# Patient Record
(demographics unavailable — no encounter records)

---

## 2024-10-11 NOTE — DISCUSSION/SUMMARY
[de-identified] :  LLUVIA CRUZ is a 74 year old female who presents with right knee bone on bone arthritis. Nonoperative treatment options for knee arthritis were discussed including anti-inflammatories, physical therapy, intraarticular cortisone injections, and hyaluronic acid gel injections. She is not a candidate for total knee replacement due to her COPD.  A series of hyaluronic acid gel injections was ordered for the patient and are pending insurance approval. The office will follow up with the patient when they become available.
n/a

## 2024-10-11 NOTE — HISTORY OF PRESENT ILLNESS
[de-identified] : Ms. LLUVIA CRUZ is a 74 year old female presenting for evaluation of longstanding right knee pain now worsening.  The knee pain is localized medially and anteriorly.  She notes the pain is worse with weightbearing activity including walking long distance and rising from seated position.  She denies any falls or trauma.  She has a history of osteoarthritis has been treated conservatively thus far.  She had a cortisone shot "within a few weeks" without significant relief.  She has not had gel as far.  She has tried physical therapy and anti-inflammatory without relief. PMHx: COPD still currently smoking, "heart issues"

## 2024-10-11 NOTE — CONSULT LETTER
[Dear  ___] : Dear  [unfilled], [Consult Letter:] : I had the pleasure of evaluating your patient, [unfilled]. [Please see my note below.] : Please see my note below. [Consult Closing:] : Thank you very much for allowing me to participate in the care of this patient.  If you have any questions, please do not hesitate to contact me. [Sincerely,] : Sincerely, [FreeTextEntry2] : SONIA MEYER MD [FreeTextEntry3] : Rafi Coates MD Chief of Joint Replacement Primary & Revision Hip and Knee Replacement  [facility]

## 2024-10-11 NOTE — PHYSICAL EXAM
[de-identified] :  The patient appears well nourished and in no apparent distress. The patient is alert and oriented to person, place, and time. Affect and mood appear normal. The head is normocephalic and atraumatic. The eyes reveal normal sclera and extra ocular muscles are intact. The tongue is midline with no apparent lesions. Skin shows normal turgor with no evidence of eczema or psoriasis. No respiratory distress noted but is on nasal cannula oxygen. Sensation grossly intact. [de-identified] :  Exam of the right knee shows -20 to 100 degrees of flexion measured with a goniometer. Her pain limits her flexion. 5/5 motor strength bilaterally distally. Sensation intact distally. [de-identified] : X-ray: 4 views of the right knee demonstrate bone on bone arthritis

## 2024-10-14 NOTE — HISTORY OF PRESENT ILLNESS
[Current] : current [>= 20 pack years] : >= 20 pack years [Never] : never [TextBox_4] : 2/28/23 71y/o  female born in  [place]  ex smoker (  17- 70y/o   1 pack day  > 20 pack years  quit 2022)    COPD severe on 3l/min   - OOH  1/3/2023 72 year old female with h/o strtokes  walks with waker  COPD on 3L home O2, CAD s/p DARRELL 8/22, anxiety, depression, MACIEL, HLD, HTN, with admission for metabolic encephalopathy secondary to ZI presenting with confusion and b/l knee pain s/p mechanical fall last night/  UTI treated and  COPD exacerbation  then  rehab  - for follow up now - per daughter -  congestion  -  two cats at home - nebulizer machine     3/28/2023  71y/o female ex smoker  -   smokes in  house    COPD  f/u  f/u OOH  ZI/ encephalopathy pneumonia  Discharge Date	23-Mar-2023 Admission Date	15-Mar-2023 06:31 Reason for Admission	ZI, encephalopathy Hospital Course	 Patient is a 72 year old female with PMH HTN, Dyslipidemia, T2DM, COPD on home O2, CVA, CAD s/p PCI in 2022, MACIEL, Anxiety/Depression, Dementia, Anemia who presented with confusion and noted to have hyponatremia, hyperkalemia and have ZI at admission. She was admitted to the MICU with septic shock secondary to ESBL UTI and toxic metabolic encephalopathy. Hospital course significant for dysphagia and hypernatremia.  PT shock state resolved BP remains stable blood cultures are negative, IV abx completed today. Pt noted to have LLL PNA likely aspiration PNA will continue  oral Augmentin for 4 days after that to cover for aspiration PNA. Hypoxia improved to 2 liters, pt on home O2 at baseline. Patient seen and examined at the bedside today. Patient clinically stable at this time. No longer requires acute in hospital level of care. Can be continually followed/managed as an outpatient.   Med Reconciliation: Medication Reconciliation Status	Admission Reconciliation is Not Complete Discharge    per spouse   -  has nurse visiting  -now on 3l/min  oxygen - n o cough  -no n/v    - no  vomiting     eats ok  -takes her symbicort  at times nebulizer only   10/5/2023 73 year old female recurrent smoker now  10 cig/day   > 50 pack years   with h/o strtokes walks with waker COPD on 3L home O2, CAD s/p DARRELL 8/22 - now off blood thiners due to nasal bleeds , anxiety, depression, MACIEL, HLD, HTN,  - last admission  six months ago - flu shot   accepts today  - trelegy  100  covered -    azihthromy  mon wed fri  10/14/2024  daughter Kym today  75y/o  female  recurrent  smoker       1/2 pack day  h/o stroke  walks with walker COPD on 3L home O2, CAD s/p DARRELL 8/22 - now off blood thinners due to nasal bleeds , anxiety, depression, MACIEL, HLD, HTN,    COPD   - wants  new  neb - no hospitalizations in one year -  [TextBox_11] : 1 [TextBox_13] : 50

## 2024-10-14 NOTE — ASSESSMENT
[FreeTextEntry1] : 74y/o  female  1- recurrent smoker > 50 pack year  2-  severe COPD  GOLD 2 D    3l/min oxygen   quit smoking 2022     > 20 Pack year  / cat / second hand smoke 3- GERD/  hoarseness  ENT 2/23  no issues  nose bleed 4- vaccinations covid   pneumonia      Recommendations 1- screening  re-ordered not done    2      trelegy  100 qd 3-quit smoking encouraged  4- PPI resume 5-  needs new neb ordered -    encouraged to use her nebulizer TID  6-  azithromycin tiw     7-   wants inogen  -  will need arm raising exercise  to evalutae   ordered PFT/exercise    accepts flu shot today ordered   return in  2months

## 2024-10-14 NOTE — REASON FOR VISIT
[Follow-Up] : a follow-up visit [COPD] : COPD [[employer] ] : provided by [employer]   [Interpreters_IDNumber] : [id number] [Interpreters_FullName] : Cindy

## 2024-10-14 NOTE — REASON FOR VISIT
[Follow-Up] : a follow-up visit [COPD] : COPD [[employer] ] : provided by [employer]   [Interpreters_IDNumber] : [id number] [Interpreters_FullName] : Cindy

## 2024-10-14 NOTE — PHYSICAL EXAM
[IV] : Mallampati Class: IV [Normal Appearance] : normal appearance [Supple] : supple [No JVD] : no jvd [Normal Rate/Rhythm] : normal rate/rhythm [Normal S1, S2] : normal s1, s2 [No Murmurs] : no murmurs [No Resp Distress] : no resp distress [No Acc Muscle Use] : no acc muscle use [Clear to Auscultation Bilaterally] : clear to auscultation bilaterally [Kyphosis] : kyphosis [Benign] : benign [Not Tender] : not tender [No Masses] : no masses [Soft] : soft [No Hernias] : no hernias [No Clubbing] : no clubbing [No Rash] : no rash [Normal Affect] : normal affect [TextBox_2] : pleasant f no distress no cough  no sob  [TextBox_11] : crowded no lesion moist [TextBox_99] : wheelchair [TextBox_105] : mild edema [TextBox_132] : deconditioning

## 2024-10-14 NOTE — PROCEDURE
[FreeTextEntry1] : 10/5/2023  walk test  walker      rest   room air  92%     walk    88%  applied  3l/min   oxygen   > 90 %  PFT  5/2019    FVC  1.37 55%   FEV1  1.01   51 %  + small airway

## 2024-10-14 NOTE — REVIEW OF SYSTEMS
[SOB on Exertion] : sob on exertion [GERD] : gerd [Blood Transfusion] : blood transfusion [Focal Weakness] : focal weakness [Obesity] : obesity [Fever] : no fever [Recent Wt Gain (___ Lbs)] : ~T no recent weight gain [Chills] : no chills [Recent Wt Loss (___ Lbs)] : ~T no recent weight loss [Epistaxis] : no epistaxis [Sore Throat] : no sore throat [Nasal Congestion] : no nasal congestion [Sinus Problems] : no sinus problems [Cough] : no cough [Hemoptysis] : no hemoptysis [Sputum] : no sputum [Dyspnea] : no dyspnea [Wheezing] : no wheezing [Abdominal Pain] : no abdominal pain [Nausea] : no nausea [Vomiting] : no vomiting [Dysphagia] : no dysphagia [Bleeding] : no bleeding [Clotting Disorder/ Frequent bleeding] : no clotting disorder/ frequent bleeding [Diabetes] : no diabetes [Thyroid Problem] : no thyroid problem [TextBox_113] : transfusion in   hosptial

## 2024-10-25 NOTE — CARDIOLOGY SUMMARY
[de-identified] : 12/2021: No infarct/ischemia, LVEF 62% [de-identified] : 4/3/24:  1. Technically difficult image quality. 2. Left ventricular systolic function is hyperdynamic with an ejection fraction of 75 % by Aragon's method of disks with an ejection fraction visually estimated at >75 %. 3. Severe left ventricular hypertrophy. 4. There is evidence of hypertrophic cardiomyopathy, resting peak gradient = 39 mmHg which increase with valsalva peak gradient = 52 mmHg. 5. There is mild (grade 1) left ventricular diastolic dysfunction. 6. The left atrium is moderately dilated. 7. Normal right ventricular cavity size and normal systolic function. 8. There is mild calcification of the mitral valve annulus. 9. Ascending aorta diameter is dilated, measuring 3.80 cm (indexed 2.17 cm/m). 10. No pericardial effusion seen. 11. Compared to the transthoracic echocardiogram performed on 3/30/2023, LVOT gradient has improved from 97mmHg to 52mmHg with valsalva. [de-identified] : Amyloid SPECT negative 8/2022 [de-identified] : 8/2022: LM no disease, mLAD 80% s/p DARRELL, LCx mild disease, OM2 40%, OM3 40%, RCA mild disease

## 2024-10-25 NOTE — DISCUSSION/SUMMARY
[FreeTextEntry1] : 75 y/o F with PMH COPD, asthma, HTN, HLD, active smoker (1/2ppd, formerly 2ppd x >50 years), chronic respiratory failure on 2-3L home O2, HCM, CAD s/p NSTEMI and DARRELL to mLAD 8/2022 presents for follow up.  #HCM Most recent TTE 4/2024 showed PG 52 mmHg with valsalva (improved from >90mg) Continue metoprolol 50mg bid  CMRI was not approved by insurance  Genetic testing was done without identified cause for HCM  Was seen by Dr. Matt at [facility], patient is a candidate for Mavacamten. Encouraged patient to follow up with him, will need to arrange echo surveillance in our office as they have difficulty traveling to [facility]  1 week MCOT to assess for ventricular arrhythmias in the setting of HCM  #CAD s/p DARRELL to LAD Stable Continue ASA, statin   #HTN Controlled Off HCTZ-valsartan Continue metoprolol DASH diet discussed  #HLD Continue lipitor Patient due for repeat labs with PCP, will have lipid panel sent to us   #Tobacco Use Smoking cessation strongly advised I especially advised patient that she cannot smoke while on home O2  #Chronic respiratory failure/COPD Follow up with pulmonary  #Obesity BMI 30 Unable to exercise due to respiratory status Diet modifications discussed.

## 2024-10-25 NOTE — HISTORY OF PRESENT ILLNESS
[FreeTextEntry1] : 75 y/o F with PMH COPD, asthma, HTN, HLD, active smoker (1/2ppd, formerly 2ppd x >50 years), COPD, chronic respiratory failure on 4L home O2, HCM, CAD s/p NSTEMI and DARRELL to mLAD 8/2022, anemia with Schatzki ring and gastritis on EGD, h/o epistaxis with septal perforation.  Admitted to [facility] 11/2023 with lethargy and anemia secondary to recurrent epistaxis. Also had LLL consolidation on CT, suspected result of aspiration of blood. ASA was restarted inpatient. Metoprolol, HCTZ-valsartan, plavix, and atorvastatin were dc'ed.  Patient was last seen by me 1/18/24 with no changes in management  Was ordered cMRI for further evaluation of HCM, which was not approved by insurance  She had repeat TTE 4/2024 with PG 52mmHg with valsalva  She was previously referred to Jamin Matt for further management of HCM; was seen 12/15/23. Was advised to follow up to initiate mavacamten, daughter reported interest but has not followed up She underwent genetic testing with no genetic causes identified for HCM  She is currently at baseline state of health. Reports occasional palpitations. She denies CP, dizziness, lightheadedness, syncope or near syncope. Her breathing is at baseline, she is on supplemental O2 at time of exam.  Continues smoking

## 2024-11-01 NOTE — DISCUSSION/SUMMARY
[de-identified] :  LLUVIA CRUZ is a 74 year old female who presents with right knee bone on bone arthritis. Nonoperative treatment options for knee arthritis were discussed including anti-inflammatories, physical therapy, intraarticular cortisone injections, and hyaluronic acid gel injections. She is not a candidate for total knee replacement due to her COPD. She received the first of three gel injections to the right knee and tolerated well. Follow up in one week.

## 2024-11-01 NOTE — PROCEDURE
[de-identified] : Allergies: The patient denies allergies to medications and has no allergies to chicken,eggs, or feathers. Procedure: The patient has been identified by name and date of birth. Patient confirms that we are treating the right knee today. The knee was prepped in the usual sterile fashion. The areas were cleansed with chlorhexadine, then sprayed with ethyl chloride. The patient was then injected with the Euflexxa into the right knee in the anterolateral position. The patient tolerated the procedure well. The medication was delivered aseptically and atraumatically. Diagnosis: Osteoarthritis of the right knee Treatment: The patient was advised on the activities for today. I gave the patient instructions on postinjection ice and analgesia.

## 2024-11-01 NOTE — PHYSICAL EXAM
[de-identified] :  The patient appears well nourished and in no apparent distress. The patient is alert and oriented to person, place, and time. Affect and mood appear normal. The head is normocephalic and atraumatic. The eyes reveal normal sclera and extra ocular muscles are intact. The tongue is midline with no apparent lesions. Skin shows normal turgor with no evidence of eczema or psoriasis. No respiratory distress noted but is on nasal cannula oxygen. Sensation grossly intact. [de-identified] :  Exam of the right knee shows -20 to 100 degrees of flexion measured with a goniometer. Her pain limits her flexion. 5/5 motor strength bilaterally distally. Sensation intact distally. [de-identified] : Prior X-ray: 4 views of the right knee demonstrate bone on bone arthritis

## 2024-11-01 NOTE — PHYSICAL EXAM
[de-identified] :  The patient appears well nourished and in no apparent distress. The patient is alert and oriented to person, place, and time. Affect and mood appear normal. The head is normocephalic and atraumatic. The eyes reveal normal sclera and extra ocular muscles are intact. The tongue is midline with no apparent lesions. Skin shows normal turgor with no evidence of eczema or psoriasis. No respiratory distress noted but is on nasal cannula oxygen. Sensation grossly intact. [de-identified] :  Exam of the right knee shows -20 to 100 degrees of flexion measured with a goniometer. Her pain limits her flexion. 5/5 motor strength bilaterally distally. Sensation intact distally. [de-identified] : Prior X-ray: 4 views of the right knee demonstrate bone on bone arthritis

## 2024-11-01 NOTE — PROCEDURE
[de-identified] : Allergies: The patient denies allergies to medications and has no allergies to chicken,eggs, or feathers. Procedure: The patient has been identified by name and date of birth. Patient confirms that we are treating the right knee today. The knee was prepped in the usual sterile fashion. The areas were cleansed with chlorhexadine, then sprayed with ethyl chloride. The patient was then injected with the Euflexxa into the right knee in the anterolateral position. The patient tolerated the procedure well. The medication was delivered aseptically and atraumatically. Diagnosis: Osteoarthritis of the right knee Treatment: The patient was advised on the activities for today. I gave the patient instructions on postinjection ice and analgesia.

## 2024-11-01 NOTE — HISTORY OF PRESENT ILLNESS
[Current] : Current [TextBox_13] : Ms. CRUZ is a 74 year old female with a history of CAD and COPD.   She was called to review eligibility for Low-Dose CT lung cancer screening.  Reviewed and confirmed that the patient meets screening eligibility criteria:   74 years old   Smoking Status:  Current Smoker   Number of pack(s) per day: 1 Number of years smoked: 55 Number of pack years smokin   No symptoms of lung cancer, including new cough, change in cough, hemoptysis, and unintentional weight loss.   No personal history of lung cancer.  No lung cancer in a first degree relative.  No history of lung disease or occupational exposures. [PacksperYear] : 55 [de-identified] : Ms. LLUVIA CRUZ is a 74 year old female presenting for evaluation of longstanding right knee pain now worsening.  The knee pain is localized medially and anteriorly.  She notes the pain is worse with weightbearing activity including walking long distance and rising from seated position.  She denies any falls or trauma.  She has a history of osteoarthritis has been treated conservatively thus far.  She had a cortisone shot "within a few weeks" without significant relief.  She has not had gel as far.  She has tried physical therapy and anti-inflammatory without relief. She presents today for the first gel injection.  PMHx: COPD still currently smoking, "heart issues"

## 2024-11-01 NOTE — DISCUSSION/SUMMARY
[de-identified] :  LLUVIA CRUZ is a 74 year old female who presents with right knee bone on bone arthritis. Nonoperative treatment options for knee arthritis were discussed including anti-inflammatories, physical therapy, intraarticular cortisone injections, and hyaluronic acid gel injections. She is not a candidate for total knee replacement due to her COPD. She received the first of three gel injections to the right knee and tolerated well. Follow up in one week.

## 2024-11-01 NOTE — HISTORY OF PRESENT ILLNESS
[Current] : Current [TextBox_13] : Ms. CRUZ is a 74 year old female with a history of CAD and COPD.   She was called to review eligibility for Low-Dose CT lung cancer screening.  Reviewed and confirmed that the patient meets screening eligibility criteria:   74 years old   Smoking Status:  Current Smoker   Number of pack(s) per day: 1 Number of years smoked: 55 Number of pack years smokin   No symptoms of lung cancer, including new cough, change in cough, hemoptysis, and unintentional weight loss.   No personal history of lung cancer.  No lung cancer in a first degree relative.  No history of lung disease or occupational exposures. [PacksperYear] : 55 [de-identified] : Ms. LLUVIA CRUZ is a 74 year old female presenting for evaluation of longstanding right knee pain now worsening.  The knee pain is localized medially and anteriorly.  She notes the pain is worse with weightbearing activity including walking long distance and rising from seated position.  She denies any falls or trauma.  She has a history of osteoarthritis has been treated conservatively thus far.  She had a cortisone shot "within a few weeks" without significant relief.  She has not had gel as far.  She has tried physical therapy and anti-inflammatory without relief. She presents today for the first gel injection.  PMHx: COPD still currently smoking, "heart issues"

## 2024-11-06 NOTE — HISTORY OF PRESENT ILLNESS
[Current] : Current [TextBox_13] : Ms. CRUZ is a 74 year old female with a history of CAD and COPD.   Spoke to daughter, Kym to review eligibility for Low-Dose CT lung cancer screening.  Reviewed and confirmed that the patient meets screening eligibility criteria:   74 years old   Smoking Status:  Current Smoker   Number of pack(s) per day: 1 Number of years smoked: 50 Number of pack years smokin   No symptoms of lung cancer, including new cough, change in cough, hemoptysis, and unintentional weight loss.   No personal history of lung cancer.  No lung cancer in a first degree relative.  No history of lung disease or occupational exposures. [PacksperYear] : 50

## 2024-11-08 NOTE — HISTORY OF PRESENT ILLNESS
[de-identified] : The patient is here today for a Euflexxa injection for the right knee. The patient is having osteoarthritic symptoms.  Allergies: The patient denies allergies to medications and has no allergies to chicken,eggs, or feathers. Procedure: The patient has been identified by name and date of birth. Patient confirms that we are treating the right knee today. The knee was prepped in the usual sterile fashion. The areas were cleansed with chlorhexadine, then sprayed with ethyl chloride. The patient was then injected with the Euflexxa into the right knee in the anterolateral position. The patient tolerated the procedure well. The medication was delivered aseptically and atraumatically. Diagnosis: Osteoarthritis of the right knee Treatment: The patient was advised on the activities for today. I gave the patient instructions on postinjection ice and analgesia. Follow up in one week.

## 2024-11-08 NOTE — REASON FOR VISIT
[Follow-Up Visit] : a follow-up visit for [FreeTextEntry2] : right knee euiflexxa inj#2 lot# [id number] exp 2025/05/27.

## 2024-11-20 NOTE — REASON FOR VISIT
Facial [Follow-Up Visit] : a follow-up visit for [FreeTextEntry2] : right knee euiflexxa inj#3 lot# [id number] exp 2025/05/27.

## 2024-11-20 NOTE — HISTORY OF PRESENT ILLNESS
[de-identified] : Patient is here for the 3rd Euflexxa injection for the right knee.  Allergies: The patient denies allergies to medications and has no allergies to chicken,eggs, or feathers. Procedure: The patient has been identified by name and date of birth. Patient confirms that we are treating the right knee today. The knee was prepped in the usual sterile fashion. The knee joint space was identified using ultrasound. The area was cleansed with chlorhexadine, then sprayed with ethyl chloride. The patient was then injected with the Euflexxa into the right knee using ultrasound guidance  and the needle position in the superolateral joint space was confirmed. The patient tolerated the procedure well. The medication was delivered aseptically and atraumatically. Diagnosis: Osteoarthritis of the right knee.  Treatment: The patient was advised on the activities for today. I gave the patient instructions on postinjection ice and analgesia. The patient will follow up in 6 weeks.

## 2025-07-29 NOTE — REASON FOR VISIT
[Follow-Up] : a follow-up visit [Abnormal CXR/ Chest CT] : an abnormal CXR/ chest CT [COPD] : COPD [Emphysema] : emphysema

## 2025-07-30 NOTE — PHYSICAL EXAM
[IV] : Mallampati Class: IV [Normal Appearance] : normal appearance [Supple] : supple [No Neck Mass] : no neck mass [No JVD] : no jvd [Normal Rate/Rhythm] : normal rate/rhythm [Normal S1, S2] : normal s1, s2 [No Murmurs] : no murmurs [No Resp Distress] : no resp distress [No Acc Muscle Use] : no acc muscle use [Clear to Auscultation Bilaterally] : clear to auscultation bilaterally [Kyphosis] : kyphosis [Benign] : benign [Not Tender] : not tender [No Hernias] : no hernias [Normal Gait] : normal gait [Gait - Sufficient For Exercise Testing] : gait sufficient for exercise testing [No Clubbing] : no clubbing [No Edema] : no edema [No Rash] : no rash [No Motor Deficits] : no motor deficits [Normal Affect] : normal affect [TextBox_2] : pleasant f no distress no cough  [TextBox_11] : chronic eye changes  no lesion crowded airway  [TextBox_68] : bronchial cough noted

## 2025-07-30 NOTE — COUNSELING
[Cessation strategies including cessation program discussed] : Cessation strategies including cessation program discussed [Use of nicotine replacement therapies and other medications discussed] : Use of nicotine replacement therapies and other medications discussed [Yes] : Risk of tobacco use and health benefits of smoking cessation discussed: Yes [Smoking Cessation Program Referral] : Smoking Cessation Program Referral  [FreeTextEntry1] : 5

## 2025-07-30 NOTE — CARDIOLOGY SUMMARY
[de-identified] : SR, LVH with strain, lateral TWI [de-identified] : 12/2021: No infarct/ischemia, LVEF 62% [de-identified] : 4/3/24:  1. Technically difficult image quality. 2. Left ventricular systolic function is hyperdynamic with an ejection fraction of 75 % by Aragon's method of disks with an ejection fraction visually estimated at >75 %. 3. Severe left ventricular hypertrophy. 4. There is evidence of hypertrophic cardiomyopathy, resting peak gradient = 39 mmHg which increase with valsalva peak gradient = 52 mmHg. 5. There is mild (grade 1) left ventricular diastolic dysfunction. 6. The left atrium is moderately dilated. 7. Normal right ventricular cavity size and normal systolic function. 8. There is mild calcification of the mitral valve annulus. 9. Ascending aorta diameter is dilated, measuring 3.80 cm (indexed 2.17 cm/m). 10. No pericardial effusion seen. 11. Compared to the transthoracic echocardiogram performed on 3/30/2023, LVOT gradient has improved from 97mmHg to 52mmHg with valsalva. [de-identified] : Amyloid SPECT negative 8/2022 [de-identified] : 8/2022: LM no disease, mLAD 80% s/p DARRELL, LCx mild disease, OM2 40%, OM3 40%, RCA mild disease

## 2025-07-30 NOTE — REVIEW OF SYSTEMS
[SOB on Exertion] : sob on exertion [GERD] : gerd [Blood Transfusion] : blood transfusion [Focal Weakness] : focal weakness [Obesity] : obesity [Fever] : no fever [Recent Wt Gain (___ Lbs)] : ~T no recent weight gain [Chills] : no chills [Recent Wt Loss (___ Lbs)] : ~T no recent weight loss [Epistaxis] : no epistaxis [Sore Throat] : no sore throat [Nasal Congestion] : no nasal congestion [Sinus Problems] : no sinus problems [Cough] : no cough [Hemoptysis] : no hemoptysis [Sputum] : no sputum [Dyspnea] : no dyspnea [Wheezing] : no wheezing [Chest Discomfort] : no chest discomfort [Palpitations] : no palpitations [Abdominal Pain] : no abdominal pain [Nausea] : no nausea [Vomiting] : no vomiting [Dysphagia] : no dysphagia [Bleeding] : no bleeding [Chronic Pain] : no chronic pain [Rash] : no rash [Clotting Disorder/ Frequent bleeding] : no clotting disorder/ frequent bleeding [Diabetes] : no diabetes [Thyroid Problem] : no thyroid problem [TextBox_113] : transfusion in   hosptial

## 2025-07-30 NOTE — HISTORY OF PRESENT ILLNESS
[Current] : current [>= 20 pack years] : >= 20 pack years [Never] : never [TextBox_4] : 2/28/23 71y/o  female born in  [place]  ex smoker (  17- 72y/o   1 pack day  > 20 pack years  quit 2022)    COPD severe on 3l/min   - OOH  1/3/2023 72 year old female with h/o strtokes  walks with waker  COPD on 3L home O2, CAD s/p DARRELL 8/22, anxiety, depression, MACIEL, HLD, HTN, with admission for metabolic encephalopathy secondary to ZI presenting with confusion and b/l knee pain s/p mechanical fall last night/  UTI treated and  COPD exacerbation  then  rehab  - for follow up now - per daughter -  congestion  -  two cats at home - nebulizer machine     3/28/2023  71y/o female ex smoker  -   smokes in  house    COPD  f/u  f/u OOH  ZI/ encephalopathy pneumonia  Discharge Date	23-Mar-2023 Admission Date	15-Mar-2023 06:31 Reason for Admission	ZI, encephalopathy Hospital Course	 Patient is a 72 year old female with PMH HTN, Dyslipidemia, T2DM, COPD on home O2, CVA, CAD s/p PCI in 2022, MACIEL, Anxiety/Depression, Dementia, Anemia who presented with confusion and noted to have hyponatremia, hyperkalemia and have ZI at admission. She was admitted to the MICU with septic shock secondary to ESBL UTI and toxic metabolic encephalopathy. Hospital course significant for dysphagia and hypernatremia.  PT shock state resolved BP remains stable blood cultures are negative, IV abx completed today. Pt noted to have LLL PNA likely aspiration PNA will continue  oral Augmentin for 4 days after that to cover for aspiration PNA. Hypoxia improved to 2 liters, pt on home O2 at baseline. Patient seen and examined at the bedside today. Patient clinically stable at this time. No longer requires acute in hospital level of care. Can be continually followed/managed as an outpatient.   Med Reconciliation: Medication Reconciliation Status	Admission Reconciliation is Not Complete Discharge    per spouse   -  has nurse visiting  -now on 3l/min  oxygen - n o cough  -no n/v    - no  vomiting     eats ok  -takes her symbicort  at times nebulizer only   10/5/2023 73 year old female recurrent smoker now  10 cig/day   > 50 pack years   with h/o strtokes walks with waker COPD on 3L home O2, CAD s/p DARRELL 8/22 - now off blood thiners due to nasal bleeds , anxiety, depression, MACIEL, HLD, HTN,  - last admission  six months ago - flu shot   accepts today  - trelegy  100  covered -    azihthromy  mon wed fri  10/14/2024  daughter Kym today  75y/o  female  recurrent  smoker       1/2 pack day  h/o stroke  walks with walker COPD on 3L home O2, CAD s/p DARRELL 8/22 - now off blood thinners due to nasal bleeds , anxiety, depression, MACIEL, HLD, HTN,    COPD   - wants  new  neb - no hospitalizations in one year -   7/29/2025   daughter Kym  here today  76y/o   female recurrent  smoker   1/2  ppd not ready to quit   h/o  stroke,  CAD stent   COPD   on  3l/min     -  using nebulizer doing well  - no hemoptysis  no  chest pain  - azihtromycin  tiw at times forgets her medications -  [TextBox_11] : 1 [TextBox_13] : 50

## 2025-07-30 NOTE — PHYSICAL EXAM
[Well Developed] : well developed [Well Nourished] : well nourished [No Acute Distress] : no acute distress [Normal S1, S2] : normal S1, S2 [No Rub] : no rub [No Gallop] : no gallop [Clear Lung Fields] : clear lung fields [Soft] : abdomen soft [Non Tender] : non-tender [No Masses/organomegaly] : no masses/organomegaly [Normal Bowel Sounds] : normal bowel sounds [Normal Gait] : normal gait [No Edema] : no edema [Moves all extremities] : moves all extremities [Normal Speech] : normal speech [Alert and Oriented] : alert and oriented [de-identified] : +3/6 systolic murmur at LUSB [de-identified] : on supplemental O2

## 2025-07-30 NOTE — PHYSICAL EXAM
[Well Developed] : well developed [Well Nourished] : well nourished [No Acute Distress] : no acute distress [Normal S1, S2] : normal S1, S2 [No Rub] : no rub [No Gallop] : no gallop [Clear Lung Fields] : clear lung fields [Soft] : abdomen soft [Non Tender] : non-tender [No Masses/organomegaly] : no masses/organomegaly [Normal Bowel Sounds] : normal bowel sounds [Normal Gait] : normal gait [No Edema] : no edema [Moves all extremities] : moves all extremities [Normal Speech] : normal speech [Alert and Oriented] : alert and oriented [de-identified] : +3/6 systolic murmur at LUSB [de-identified] : on supplemental O2

## 2025-07-30 NOTE — CARDIOLOGY SUMMARY
[de-identified] : SR, LVH with strain, lateral TWI [de-identified] : 12/2021: No infarct/ischemia, LVEF 62% [de-identified] : 4/3/24:  1. Technically difficult image quality. 2. Left ventricular systolic function is hyperdynamic with an ejection fraction of 75 % by Aragon's method of disks with an ejection fraction visually estimated at >75 %. 3. Severe left ventricular hypertrophy. 4. There is evidence of hypertrophic cardiomyopathy, resting peak gradient = 39 mmHg which increase with valsalva peak gradient = 52 mmHg. 5. There is mild (grade 1) left ventricular diastolic dysfunction. 6. The left atrium is moderately dilated. 7. Normal right ventricular cavity size and normal systolic function. 8. There is mild calcification of the mitral valve annulus. 9. Ascending aorta diameter is dilated, measuring 3.80 cm (indexed 2.17 cm/m). 10. No pericardial effusion seen. 11. Compared to the transthoracic echocardiogram performed on 3/30/2023, LVOT gradient has improved from 97mmHg to 52mmHg with valsalva. [de-identified] : Amyloid SPECT negative 8/2022 [de-identified] : 8/2022: LM no disease, mLAD 80% s/p DARRELL, LCx mild disease, OM2 40%, OM3 40%, RCA mild disease

## 2025-07-30 NOTE — HISTORY OF PRESENT ILLNESS
[FreeTextEntry1] : 75F with PMH HCM (cMRI not approved by insurance, genetic testing without identified cause), COPD, asthma, HTN, HLD, active smoker (1/2ppd, formerly 2ppd x >50 years), chronic respiratory failure on 4L home O2, CAD s/p NSTEMI and DARRELL to mLAD 8/2022, anemia with Schatzki ring and gastritis on EGD, h/o epistaxis with septal perforation.   Patient was last seen by me 10/2024 She is a poor historian, history obtained from patient's daughter. Poor insight into health. Unsure of current medications. She is currently in her usual state of health. Patient has baseline dyspnea, on 4L home O2. She denies CP, palpitations, dizziness, lightheadedness, syncope or near syncope. Admits to some b/l LE edema to ankles after being on her feet all day.   Continues smoking  Has not followed up with Dr. Matt regarding HCM

## 2025-07-30 NOTE — DISCUSSION/SUMMARY
[FreeTextEntry1] : 75F with PMH HCM (cMRI not approved by insurance, genetic testing without identified cause), COPD, asthma, HTN, HLD, active smoker (1/2ppd, formerly 2ppd x >50 years), chronic respiratory failure on 4L home O2, CAD s/p NSTEMI and DARRELL to mLAD 8/2022, anemia with Schatzki ring and gastritis on EGD, h/o epistaxis with septal perforation.  #HCM Most recent TTE 4/2024 showed PG 52 mmHg with valsalva (improved from >90mg) Patient unsure of medications, was previously on metoprolol 50mg bid. Per patient's daughter, she was previously noncompliant with medication due to forgetfulness, but she now has a home aide and her daughter arranges her daily meds for her to take. Will ask RN team to call patient to confirm home meds.  Repeat TTE, plan to increase metoprolol as tolerated  CMRI was not approved by insurance  Genetic testing was done without identified cause for HCM  Was seen by Dr. Matt at [facility], patient is a candidate for Mavacamten. Encouraged patient to follow up with him, will need to arrange echo surveillance in our office as they have difficulty traveling to [facility]   #CAD s/p DARRELL to LAD Stable Continue ASA, statin  Repeat NST in view of TWI on EKG   #HTN Controlled Patient unsure of medications, RN team requested to confirm home medications ?Off HCTZ-valsartan Continue metoprolol DASH diet discussed  #HLD Continue lipitor Request labs from PCP   #Tobacco Use Smoking cessation strongly advised I especially advised patient that she cannot smoke while on home O2  #Chronic respiratory failure/COPD Follow up with pulmonary  #Obesity BMI 34 Unable to exercise due to respiratory status Diet modifications discussed. [EKG obtained to assist in diagnosis and management of assessed problem(s)] : EKG obtained to assist in diagnosis and management of assessed problem(s)

## 2025-07-30 NOTE — DISCUSSION/SUMMARY
[FreeTextEntry1] : 75F with PMH HCM (cMRI not approved by insurance, genetic testing without identified cause), COPD, asthma, HTN, HLD, active smoker (1/2ppd, formerly 2ppd x >50 years), chronic respiratory failure on 4L home O2, CAD s/p NSTEMI and DARRELL to mLAD 8/2022, anemia with Schatzki ring and gastritis on EGD, h/o epistaxis with septal perforation.  #HCM Most recent TTE 4/2024 showed PG 52 mmHg with valsalva (improved from >90mg) Patient unsure of medications, was previously on metoprolol 50mg bid. Per patient's daughter, she was previously noncompliant with medication due to forgetfulness, but she now has a home aide and her daughter arranges her daily meds for her to take. Will ask RN team to call patient to confirm home meds.  Repeat TTE, plan to increase metoprolol as tolerated  CMRI was not approved by insurance  Genetic testing was done without identified cause for HCM  Was seen by Dr. Matt at [facility], patient is a candidate for Mavacamten. Encouraged patient to follow up with him, will need to arrange echo surveillance in our office as they have difficulty traveling to [facility]   #CAD s/p DARRELL to LAD Stable Continue ASA, statin  Repeat NST in view of TWI on EKG   #HTN Controlled Patient unsure of medications, RN team requested to confirm home medications ?Off HCTZ-valsartan Continue metoprolol DASH diet discussed  #HLD Continue lipitor Request labs from PCP   #Tobacco Use Smoking cessation strongly advised I especially advised patient that she cannot smoke while on home O2  #Chronic respiratory failure/COPD Follow up with pulmonary  #Obesity BMI 34 Unable to exercise due to respiratory status Diet modifications discussed. [EKG obtained to assist in diagnosis and management of assessed problem(s)] : EKG obtained to assist in diagnosis and management of assessed problem(s)

## 2025-07-30 NOTE — HISTORY OF PRESENT ILLNESS
[Current] : current [>= 20 pack years] : >= 20 pack years [Never] : never [TextBox_4] : 2/28/23 71y/o  [place] in  [place]  ex smoker (  17- 70y/o   1 pack day  > 20 pack years  quit 2022)    COPD severe on 3l/min   - OOH  1/3/2023 72 year old female with h/o strtokes  walks with waker  COPD on 3L home O2, CAD s/p DARRELL 8/22, anxiety, depression, MACIEL, HLD, HTN, with admission for metabolic encephalopathy secondary to ZI presenting with confusion and b/l knee pain s/p mechanical fall last night/  UTI treated and  COPD exacerbation  then  rehab  - for follow up now - per daughter -  congestion  -  two cats at home - nebulizer machine     3/28/2023  71y/o female ex smoker  -   smokes in  house    COPD  f/u  f/u OOH  ZI/ encephalopathy pneumonia  Discharge Date	23-Mar-2023 Admission Date	15-Mar-2023 06:31 Reason for Admission	ZI, encephalopathy Hospital Course	 Patient is a 72 year old female with PMH HTN, Dyslipidemia, T2DM, COPD on home O2, CVA, CAD s/p PCI in 2022, MACILE, Anxiety/Depression, Dementia, Anemia who presented with confusion and noted to have hyponatremia, hyperkalemia and have ZI at admission. She was admitted to the MICU with septic shock secondary to ESBL UTI and toxic metabolic encephalopathy. Hospital course significant for dysphagia and hypernatremia.  PT shock state resolved BP remains stable blood cultures are negative, IV abx completed today. Pt noted to have LLL PNA likely aspiration PNA will continue  oral Augmentin for 4 days after that to cover for aspiration PNA. Hypoxia improved to 2 liters, pt on home O2 at baseline. Patient seen and examined at the bedside today. Patient clinically stable at this time. No longer requires acute in hospital level of care. Can be continually followed/managed as an outpatient.   Med Reconciliation: Medication Reconciliation Status	Admission Reconciliation is Not Complete Discharge    per spouse   -  has nurse visiting  -now on 3l/min  oxygen - n o cough  -no n/v    - no  vomiting     eats ok  -takes her symbicort  at times nebulizer only   10/5/2023 73 year old female recurrent smoker now  10 cig/day   > 50 pack years   with h/o strtokes walks with waker COPD on 3L home O2, CAD s/p DARRELL 8/22 - now off blood thiners due to nasal bleeds , anxiety, depression, MACIEL, HLD, HTN,  - last admission  six months ago - flu shot   accepts today  - trelegy  100  covered -    azihthromy  mon wed fri  10/14/2024  daughter Kym today  75y/o  female  recurrent  smoker       1/2 pack day  h/o stroke  walks with walker COPD on 3L home O2, CAD s/p DARRELL 8/22 - now off blood thinners due to nasal bleeds , anxiety, depression, MACIEL, HLD, HTN,    COPD   - wants  new  neb - no hospitalizations in one year -   7/29/2025   daughter Kym  here today  74y/o   female recurrent  smoker   1/2  ppd not ready to quit   h/o  stroke,  CAD stent   COPD   on  3l/min     -  using nebulizer doing well  - no hemoptysis  no  chest pain  - azihtromycin  tiw at times forgets her medications -  [TextBox_11] : 1 [TextBox_13] : 50

## 2025-07-30 NOTE — ASSESSMENT
[FreeTextEntry1] : 74y/o  female  1-   recurrent smoker > 50 pack year  2-   severe COPD  GOLD 2 D    3l/min oxygen   quit smoking 2022     > 20 Pack year  / cat / second hand smoke 3-  GERD/  hoarseness  ENT 2/23  no issues  nose bleed 4-  forgetful with medications 5- vaccinations covid   pneumonia    flu shot     Recommendations 1- screening   ct ordered  11/2025  2      trelegy   will increase to  200 qd rinse after use  3- quit smoking encouraged  complications of  smoking including cardiac arrest reviewed  4-  PPI   5-  PFT  when she can do      6-  azithromycin tiw     7-    3l/min   she is using her   oxygen and benefiting from it  8- xopenex MDI  reviewed proper use  two in  q  6 jazmin rprn       -discussion reviewed medications - reviewed use of   oxygen  - prevention reviewed